# Patient Record
Sex: MALE | Race: WHITE | NOT HISPANIC OR LATINO | Employment: OTHER | URBAN - NONMETROPOLITAN AREA
[De-identification: names, ages, dates, MRNs, and addresses within clinical notes are randomized per-mention and may not be internally consistent; named-entity substitution may affect disease eponyms.]

---

## 2021-01-01 ENCOUNTER — OFFICE VISIT (OUTPATIENT)
Dept: NEUROLOGY | Facility: OTHER | Age: 66
End: 2021-01-01
Attending: PSYCHIATRY & NEUROLOGY
Payer: MEDICARE

## 2021-01-01 VITALS
OXYGEN SATURATION: 98 % | WEIGHT: 220.8 LBS | BODY MASS INDEX: 32.7 KG/M2 | DIASTOLIC BLOOD PRESSURE: 74 MMHG | HEART RATE: 84 BPM | SYSTOLIC BLOOD PRESSURE: 132 MMHG | HEIGHT: 69 IN | TEMPERATURE: 98.3 F | RESPIRATION RATE: 18 BRPM

## 2021-01-01 DIAGNOSIS — G60.3 IDIOPATHIC PROGRESSIVE POLYNEUROPATHY: Primary | ICD-10-CM

## 2021-01-01 PROCEDURE — G0463 HOSPITAL OUTPT CLINIC VISIT: HCPCS

## 2021-01-01 PROCEDURE — 95912 NRV CNDJ TEST 11-12 STUDIES: CPT | Performed by: PSYCHIATRY & NEUROLOGY

## 2021-01-01 PROCEDURE — 95886 MUSC TEST DONE W/N TEST COMP: CPT | Mod: 26 | Performed by: PSYCHIATRY & NEUROLOGY

## 2021-01-01 PROCEDURE — 99204 OFFICE O/P NEW MOD 45 MIN: CPT | Mod: 25 | Performed by: PSYCHIATRY & NEUROLOGY

## 2021-01-01 PROCEDURE — 95886 MUSC TEST DONE W/N TEST COMP: CPT | Performed by: PSYCHIATRY & NEUROLOGY

## 2021-01-01 PROCEDURE — 95912 NRV CNDJ TEST 11-12 STUDIES: CPT | Mod: 26 | Performed by: PSYCHIATRY & NEUROLOGY

## 2021-01-01 RX ORDER — ATORVASTATIN CALCIUM 40 MG/1
40 TABLET, FILM COATED ORAL DAILY
COMMUNITY

## 2021-01-01 ASSESSMENT — MIFFLIN-ST. JEOR: SCORE: 1761.92

## 2021-01-01 ASSESSMENT — PAIN SCALES - GENERAL: PAINLEVEL: NO PAIN (0)

## 2021-09-15 NOTE — LETTER
9/15/2021         RE: Branden Garcias  [address]  [facility] [address]        Dear Colleague,    Thank you for referring your patient, Branden Garcias, to the [facility]. Please see a copy of my visit note below.    Visit Date: 09/15/2021    REFERRING PHYSICIAN:  ROS Manning    HISTORY OF PRESENT ILLNESS:  The patient is a pleasant 66-year-old gentleman who relates a 20-year history of slowly progressive symptoms of paresthesia in the distal lower extremities.  This is somewhat aggravated if he spends a great deal of time on his feet.  For the most part, he does not consider the symptom painful.  Initially, his symptoms were intermittent, but for a number of years, they have been persistent and now extend to involve the entirety of both feet.  He does not describe difficulty with balance or any weakness.    PAST MEDICAL HISTORY:  Significant for obesity, but generally, the patient has been healthy.  He is not diabetic.    REVIEW OF SYSTEMS:  A 10-system review of systems is negative.    FAMILY HISTORY:  Negative for neuropathy, as far as the patient is aware.    PHYSICAL EXAMINATION:  Physical exam reveals a somewhat overweight, but otherwise healthy-appearing 66-year-old.  He is a good historian.  Strength is 5/5 bilaterally for the dorsi and plantar flexors of the feet and toes.  There is no muscle wasting, including the intrinsic muscles of the feet.  Reflexes are 2/4 at the knees, but absent at the ankles.  Vibratory perception is minimal with extinction of a 128 Hz tuning fork at 2-3 seconds at each ankle.  Cold sensation is preserved in the feet.    NERVE CONDUCTION STUDIES:  Motor nerve conduction testing was performed bilaterally for the peroneal and tibial nerves.  Distal latencies, amplitudes of the waveforms, conduction velocities and H-reflex latencies were normal throughout.    Antidromic sensory nerve testing was performed bilaterally for the sural and  peroneal nerves.  Waveforms were uniformly absent.    MONOPOLAR EMG NEEDLE EXAMINATION:  Monopolar needle exam was performed for the bilateral tibialis anterior, gastrocnemius, extensor hallucis longus, soleus, and abductor hallucis.  All of the tested muscles showed normal insertional activity.  Motor units were normal in size with normal recruitment and interference.    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  This will probably prove to be idiopathic.  The most important laboratory studies would be those to rule out hypothyroidism and B12 deficiency.  He does sound to experience some discomfort after prolonged ambulation.  He likely would benefit from the use of gabapentin.    Findings were reviewed with the patient.    Pablo Adams MD        D: 09/15/2021   T: 09/15/2021   MT: [id number]    Name:     NATHANIEL SOTELO  MRN:      [id number]        Account:    [id number]   :      1955           Visit Date: 09/15/2021     Document: [id number]    cc:  KARTHIK KEANE       Again, thank you for allowing me to participate in the care of your patient.        Sincerely,        Pablo Adams MD

## 2021-09-15 NOTE — PROGRESS NOTES
Visit Date: 09/15/2021    REFERRING PHYSICIAN:  ROS Manning    HISTORY OF PRESENT ILLNESS:  The patient is a pleasant 66-year-old gentleman who relates a 20-year history of slowly progressive symptoms of paresthesia in the distal lower extremities.  This is somewhat aggravated if he spends a great deal of time on his feet.  For the most part, he does not consider the symptom painful.  Initially, his symptoms were intermittent, but for a number of years, they have been persistent and now extend to involve the entirety of both feet.  He does not describe difficulty with balance or any weakness.    PAST MEDICAL HISTORY:  Significant for obesity, but generally, the patient has been healthy.  He is not diabetic.    REVIEW OF SYSTEMS:  A 10-system review of systems is negative.    FAMILY HISTORY:  Negative for neuropathy, as far as the patient is aware.    PHYSICAL EXAMINATION:  Physical exam reveals a somewhat overweight, but otherwise healthy-appearing 66-year-old.  He is a good historian.  Strength is 5/5 bilaterally for the dorsi and plantar flexors of the feet and toes.  There is no muscle wasting, including the intrinsic muscles of the feet.  Reflexes are 2/4 at the knees, but absent at the ankles.  Vibratory perception is minimal with extinction of a 128 Hz tuning fork at 2-3 seconds at each ankle.  Cold sensation is preserved in the feet.    NERVE CONDUCTION STUDIES:  Motor nerve conduction testing was performed bilaterally for the peroneal and tibial nerves.  Distal latencies, amplitudes of the waveforms, conduction velocities and H-reflex latencies were normal throughout.    Antidromic sensory nerve testing was performed bilaterally for the sural and peroneal nerves.  Waveforms were uniformly absent.    MONOPOLAR EMG NEEDLE EXAMINATION:  Monopolar needle exam was performed for the bilateral tibialis anterior, gastrocnemius, extensor hallucis longus, soleus, and abductor hallucis.  All of the tested  muscles showed normal insertional activity.  Motor units were normal in size with normal recruitment and interference.    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  This will probably prove to be idiopathic.  The most important laboratory studies would be those to rule out hypothyroidism and B12 deficiency.  He does sound to experience some discomfort after prolonged ambulation.  He likely would benefit from the use of gabapentin.    Findings were reviewed with the patient.    Pablo Adams MD        D: 09/15/2021   T: 09/15/2021   MT: SOMMER    Name:     NATHANIEL SOTELO  MRN:      [id number]        Account:    [id number]   :      1955           Visit Date: 09/15/2021     Document: [id number]    cc:  KARTHIK KEANE